# Patient Record
Sex: MALE | Race: WHITE | NOT HISPANIC OR LATINO
[De-identification: names, ages, dates, MRNs, and addresses within clinical notes are randomized per-mention and may not be internally consistent; named-entity substitution may affect disease eponyms.]

---

## 2017-06-09 ENCOUNTER — CHARTING TRANS (OUTPATIENT)
Dept: OTHER | Age: 37
End: 2017-06-09

## 2017-06-09 ASSESSMENT — PAIN SCALES - GENERAL: PAINLEVEL_OUTOF10: 0

## 2018-01-24 ENCOUNTER — TELEPHONE (OUTPATIENT)
Dept: OPTOMETRY | Facility: CLINIC | Age: 38
End: 2018-01-24

## 2018-02-02 ENCOUNTER — OFFICE VISIT (OUTPATIENT)
Dept: OPTOMETRY | Facility: CLINIC | Age: 38
End: 2018-02-02

## 2018-02-02 DIAGNOSIS — H52.13 MYOPIA OF BOTH EYES: Primary | ICD-10-CM

## 2018-02-02 PROCEDURE — 92012 INTRM OPH EXAM EST PATIENT: CPT | Performed by: OPTOMETRIST

## 2018-02-02 PROCEDURE — 92015 DETERMINE REFRACTIVE STATE: CPT | Performed by: OPTOMETRIST

## 2018-02-02 NOTE — PATIENT INSTRUCTIONS
Myopia  Gave copy of both B & L and AV brand contact lens RX. I  Gave a trial pair of Merna to see if he likes durability. Lens is very comfortable. Patient will continue to dispose of after one month of daily wear.

## 2018-02-02 NOTE — ASSESSMENT & PLAN NOTE
Gave copy of both B & L and AV brand contact lens RX. I  Gave a trial pair of Merna to see if he likes durability. Lens is very comfortable. Patient will continue to dispose of after one month of daily wear.

## 2018-02-02 NOTE — PROGRESS NOTES
John Chaudhry is a 42year old male. HPI:     HPI     Patient is in for an annual contact lens exam. Patient is happy for the most part with his  B & L enses but they tear and curl up a lot. May want to try a more durable brand.  I advised possible AV External Exam       Right Left    External Normal Normal          Slit Lamp Exam       Right Left    Lids/Lashes Normal Normal    Conjunctiva/Sclera Normal Normal    Cornea Clear Clear    Anterior Chamber Deep and quiet Deep and quiet    Iris Normal Nor this encounter.       Meds This Visit:    No prescriptions requested or ordered in this encounter     Follow up instructions:  Return in about 1 year (around 2/2/2019) for Eye Exam.    2/2/2018  Scribed by: Cecilio Corona

## 2018-05-07 ENCOUNTER — TELEPHONE (OUTPATIENT)
Dept: OPTOMETRY | Facility: CLINIC | Age: 38
End: 2018-05-07

## 2018-05-07 NOTE — TELEPHONE ENCOUNTER
Patient requesting copies of his Contact Rx to be faxed to his work today at [facility]: [phone]. Please send thank you.

## 2018-11-03 VITALS
OXYGEN SATURATION: 98 % | WEIGHT: 208.38 LBS | RESPIRATION RATE: 16 BRPM | BODY MASS INDEX: 27.62 KG/M2 | HEIGHT: 73 IN | SYSTOLIC BLOOD PRESSURE: 138 MMHG | HEART RATE: 66 BPM | DIASTOLIC BLOOD PRESSURE: 96 MMHG | TEMPERATURE: 98.6 F

## 2019-07-24 ENCOUNTER — TELEPHONE (OUTPATIENT)
Dept: OPTOMETRY | Facility: CLINIC | Age: 39
End: 2019-07-24

## 2019-08-06 ENCOUNTER — TELEPHONE (OUTPATIENT)
Dept: OPTOMETRY | Facility: CLINIC | Age: 39
End: 2019-08-06

## 2019-08-16 ENCOUNTER — OFFICE VISIT (OUTPATIENT)
Dept: OPTOMETRY | Facility: CLINIC | Age: 39
End: 2019-08-16

## 2019-08-16 DIAGNOSIS — H52.13 MYOPIA OF BOTH EYES: ICD-10-CM

## 2019-08-16 PROCEDURE — 92012 INTRM OPH EXAM EST PATIENT: CPT | Performed by: OPTOMETRIST

## 2019-08-16 PROCEDURE — 92015 DETERMINE REFRACTIVE STATE: CPT | Performed by: OPTOMETRIST

## 2019-08-16 RX ORDER — SULFAMETHOXAZOLE AND TRIMETHOPRIM 800; 160 MG/1; MG/1
TABLET ORAL
Refills: 0 | COMMUNITY
Start: 2019-08-05

## 2019-08-16 RX ORDER — LEVOCETIRIZINE DIHYDROCHLORIDE 5 MG/1
TABLET, FILM COATED ORAL
Refills: 3 | COMMUNITY
Start: 2019-04-09

## 2019-08-16 NOTE — PROGRESS NOTES
Yousif Benjamin is a 34year old male. HPI:     HPI     Patient is in for an annual contact lens exam. He is happy with his B & L lenses --disposes of after 1 month of daily wear. Uses generic solution.  I recommended Optifree Puremoist .    Last edited b amish, 3:16 PM)       Right Left    Pressure 19 16          Pupils       Pupils    Right PERRL    Left PERRL          Visual Fields       Left Right     Full Full          Extraocular Movement       Right Left     Full, Ortho Full, Ortho          Neuro/Psyc placed in this encounter.       Meds This Visit:  Requested Prescriptions      No prescriptions requested or ordered in this encounter        Follow up instructions:  Return in about 1 year (around 8/16/2020) for Eye Exam.    8/16/2019  Scribed by: Jake Moore

## 2021-05-28 ENCOUNTER — IMMUNIZATION (OUTPATIENT)
Dept: LAB | Age: 41
End: 2021-05-28

## 2021-05-28 DIAGNOSIS — Z23 NEED FOR VACCINATION: Primary | ICD-10-CM

## 2021-05-28 PROCEDURE — 0011A COVID-19 MODERNA VACCINE: CPT

## 2021-05-28 PROCEDURE — 91301 COVID-19 MODERNA VACCINE: CPT

## 2021-11-19 ENCOUNTER — OFFICE VISIT (OUTPATIENT)
Dept: OPTOMETRY | Facility: CLINIC | Age: 41
End: 2021-11-19

## 2021-11-19 DIAGNOSIS — H52.13 MYOPIA OF BOTH EYES: Primary | ICD-10-CM

## 2021-11-19 PROCEDURE — 92012 INTRM OPH EXAM EST PATIENT: CPT | Performed by: OPTOMETRIST

## 2021-11-19 NOTE — PROGRESS NOTES
Karlo Bennett is a 41year old male. HPI:     HPI     Patient is in for his annual eye exam . He is happy with his B & L Soflens contacts--replaces monthly. Offered ed but happy with current. . Has some distance vision issues driving at night . PHYSICAL EXAM:     Base Eye Exam     Visual Acuity (Snellen - Linear)       Right Left    Dist cc 20/20 - 20/20 -    Near cc 4pt 4pt    Correction: Contacts          Tonometry (Icare, 3:21 PM)       Right Left    Pressure 16 15          Pupils Date: 11/20/2022                 ASSESSMENT/PLAN:     Diagnoses and Plan:     Myopia  New glasses and contact lens RX given today. No orders of the defined types were placed in this encounter.       Meds This Visit:  Requested Prescriptions      No pre

## 2022-01-08 ENCOUNTER — IMMUNIZATION (OUTPATIENT)
Dept: LAB | Facility: HOSPITAL | Age: 42
End: 2022-01-08
Attending: EMERGENCY MEDICINE
Payer: COMMERCIAL

## 2022-01-08 DIAGNOSIS — Z23 NEED FOR VACCINATION: Primary | ICD-10-CM

## 2022-01-08 PROCEDURE — 0064A SARSCOV2 VAC 50MCG/0.25ML IM: CPT

## 2022-11-27 ENCOUNTER — HOSPITAL ENCOUNTER (OUTPATIENT)
Age: 42
Discharge: HOME | End: 2022-11-27
Payer: COMMERCIAL

## 2022-11-27 VITALS
TEMPERATURE: 99 F | DIASTOLIC BLOOD PRESSURE: 95 MMHG | SYSTOLIC BLOOD PRESSURE: 153 MMHG | RESPIRATION RATE: 18 BRPM | HEART RATE: 110 BPM | WEIGHT: 210 LBS | OXYGEN SATURATION: 98 % | HEIGHT: 72 IN | BODY MASS INDEX: 28.44 KG/M2

## 2022-11-27 DIAGNOSIS — J01.00 ACUTE NON-RECURRENT MAXILLARY SINUSITIS: Primary | ICD-10-CM

## 2022-11-27 RX ORDER — AMOXICILLIN AND CLAVULANATE POTASSIUM 875; 125 MG/1; MG/1
1 TABLET, FILM COATED ORAL 2 TIMES DAILY
Qty: 14 TABLET | Refills: 0 | Status: SHIPPED | OUTPATIENT
Start: 2022-11-27 | End: 2022-12-04

## 2022-11-27 NOTE — ED INITIAL ASSESSMENT (HPI)
Pt presents to the IC with c/o nasal congestion and sinus pressure for 5 days. No fevers. Pt is a dentist. +cough, productive white. covid neg at home.